# Patient Record
Sex: FEMALE | Race: WHITE | NOT HISPANIC OR LATINO | Employment: FULL TIME | URBAN - METROPOLITAN AREA
[De-identification: names, ages, dates, MRNs, and addresses within clinical notes are randomized per-mention and may not be internally consistent; named-entity substitution may affect disease eponyms.]

---

## 2017-03-25 ENCOUNTER — APPOINTMENT (OUTPATIENT)
Dept: ULTRASOUND IMAGING | Facility: HOSPITAL | Age: 27
End: 2017-03-25

## 2017-03-25 ENCOUNTER — HOSPITAL ENCOUNTER (EMERGENCY)
Facility: HOSPITAL | Age: 27
Discharge: HOME | End: 2017-03-25
Attending: EMERGENCY MEDICINE | Admitting: EMERGENCY MEDICINE

## 2017-03-25 VITALS
HEIGHT: 63 IN | WEIGHT: 252 LBS | TEMPERATURE: 98.3 F | DIASTOLIC BLOOD PRESSURE: 70 MMHG | HEART RATE: 88 BPM | RESPIRATION RATE: 16 BRPM | OXYGEN SATURATION: 98 % | SYSTOLIC BLOOD PRESSURE: 130 MMHG | BODY MASS INDEX: 44.65 KG/M2

## 2017-03-25 DIAGNOSIS — O20.0 ABORTION, THREATENED, EARLY PREGNANCY: ICD-10-CM

## 2017-03-25 DIAGNOSIS — Z3A.01 LESS THAN 8 WEEKS GESTATION OF PREGNANCY: Primary | ICD-10-CM

## 2017-03-25 DIAGNOSIS — N93.9 VAGINAL BLEEDING: ICD-10-CM

## 2017-03-25 LAB
ABO GROUP BLD: NORMAL
ANION GAP SERPL CALCULATED.3IONS-SCNC: 3 MMOL/L (ref 3–11)
BASOPHILS # BLD AUTO: 0.02 10*3/MM3 (ref 0–0.2)
BASOPHILS NFR BLD AUTO: 0.2 % (ref 0–1)
BUN BLD-MCNC: 11 MG/DL (ref 9–23)
BUN/CREAT SERPL: 15.7 (ref 7–25)
CALCIUM SPEC-SCNC: 9.6 MG/DL (ref 8.7–10.4)
CHLORIDE SERPL-SCNC: 107 MMOL/L (ref 99–109)
CO2 SERPL-SCNC: 29 MMOL/L (ref 20–31)
CREAT BLD-MCNC: 0.7 MG/DL (ref 0.6–1.3)
DEPRECATED RDW RBC AUTO: 39.9 FL (ref 37–54)
EOSINOPHIL # BLD AUTO: 0.09 10*3/MM3 (ref 0.1–0.3)
EOSINOPHIL NFR BLD AUTO: 0.9 % (ref 0–3)
ERYTHROCYTE [DISTWIDTH] IN BLOOD BY AUTOMATED COUNT: 12.4 % (ref 11.3–14.5)
GFR SERPL CREATININE-BSD FRML MDRD: 100 ML/MIN/1.73
GLUCOSE BLD-MCNC: 94 MG/DL (ref 70–100)
HCG INTACT+B SERPL-ACNC: 13 MIU/ML
HCT VFR BLD AUTO: 41 % (ref 34.5–44)
HGB BLD-MCNC: 13.6 G/DL (ref 11.5–15.5)
HOLD SPECIMEN: NORMAL
HOLD SPECIMEN: NORMAL
IMM GRANULOCYTES # BLD: 0.01 10*3/MM3 (ref 0–0.03)
IMM GRANULOCYTES NFR BLD: 0.1 % (ref 0–0.6)
LYMPHOCYTES # BLD AUTO: 2.41 10*3/MM3 (ref 0.6–4.8)
LYMPHOCYTES NFR BLD AUTO: 24.8 % (ref 24–44)
MCH RBC QN AUTO: 29.8 PG (ref 27–31)
MCHC RBC AUTO-ENTMCNC: 33.2 G/DL (ref 32–36)
MCV RBC AUTO: 89.7 FL (ref 80–99)
MONOCYTES # BLD AUTO: 0.66 10*3/MM3 (ref 0–1)
MONOCYTES NFR BLD AUTO: 6.8 % (ref 0–12)
NEUTROPHILS # BLD AUTO: 6.53 10*3/MM3 (ref 1.5–8.3)
NEUTROPHILS NFR BLD AUTO: 67.2 % (ref 41–71)
NUMBER OF DOSES: NORMAL
PLATELET # BLD AUTO: 223 10*3/MM3 (ref 150–450)
PMV BLD AUTO: 9.9 FL (ref 6–12)
POTASSIUM BLD-SCNC: 3.7 MMOL/L (ref 3.5–5.5)
RBC # BLD AUTO: 4.57 10*6/MM3 (ref 3.89–5.14)
RH BLD: POSITIVE
SODIUM BLD-SCNC: 139 MMOL/L (ref 132–146)
WBC NRBC COR # BLD: 9.72 10*3/MM3 (ref 3.5–10.8)
WHOLE BLOOD HOLD SPECIMEN: NORMAL
WHOLE BLOOD HOLD SPECIMEN: NORMAL

## 2017-03-25 PROCEDURE — 86850 RBC ANTIBODY SCREEN: CPT | Performed by: EMERGENCY MEDICINE

## 2017-03-25 PROCEDURE — 36415 COLL VENOUS BLD VENIPUNCTURE: CPT

## 2017-03-25 PROCEDURE — 84702 CHORIONIC GONADOTROPIN TEST: CPT | Performed by: EMERGENCY MEDICINE

## 2017-03-25 PROCEDURE — 85025 COMPLETE CBC W/AUTO DIFF WBC: CPT | Performed by: EMERGENCY MEDICINE

## 2017-03-25 PROCEDURE — 86900 BLOOD TYPING SEROLOGIC ABO: CPT | Performed by: EMERGENCY MEDICINE

## 2017-03-25 PROCEDURE — 86901 BLOOD TYPING SEROLOGIC RH(D): CPT | Performed by: EMERGENCY MEDICINE

## 2017-03-25 PROCEDURE — 99283 EMERGENCY DEPT VISIT LOW MDM: CPT

## 2017-03-25 PROCEDURE — 80048 BASIC METABOLIC PNL TOTAL CA: CPT | Performed by: EMERGENCY MEDICINE

## 2017-03-25 RX ORDER — SODIUM CHLORIDE 0.9 % (FLUSH) 0.9 %
10 SYRINGE (ML) INJECTION AS NEEDED
Status: DISCONTINUED | OUTPATIENT
Start: 2017-03-25 | End: 2017-03-25 | Stop reason: HOSPADM

## 2017-03-25 RX ORDER — RANITIDINE HCL 75 MG
75 TABLET ORAL DAILY
COMMUNITY
End: 2018-03-29

## 2017-03-25 NOTE — ED PROVIDER NOTES
Subjective   Patient is a 27 y.o. female presenting with pregnancy problem.   History provided by:  Patient and significant other  Pregnancy Problem   The current episode started today. The problem has been unchanged. The problem occurs every urination. Gestational age is 7 weeks (Patient unsure of dates as she has had irregular periods).   Primary symptoms include abdominal pain (Mild pelvic cramping) and vaginal bleeding. Primary symptoms: Reports light pink scant bleeding with wiping only. There has been no prenatal care (Is scheduled for 6 days from now with [facility]).   The abdominal pain began today. The abdominal pain has been unchanged since its onset. The abdominal pain is located in the suprapubic region. Pain is described as cramping (Mild). The abdominal pain is relieved by nothing.   Associated symptoms include no dysuria, no fever, no light-headedness, no nausea, no syncope and no vomiting.   Risk factors include obesity.       Review of Systems   Constitutional: Negative for fever.   Cardiovascular: Negative for syncope.   Gastrointestinal: Positive for abdominal pain (Mild pelvic cramping). Negative for nausea and vomiting.   Genitourinary: Positive for vaginal bleeding. Negative for dysuria.   Neurological: Negative for dizziness and light-headedness.   All other systems reviewed and are negative.      History reviewed. No pertinent past medical history.    Allergies   Allergen Reactions   • Claritin [Loratadine] Cough   • Promethazine Swelling       History reviewed. No pertinent surgical history.    History reviewed. No pertinent family history.    Social History     Social History   • Marital status:      Spouse name: N/A   • Number of children: N/A   • Years of education: N/A     Social History Main Topics   • Smoking status: Former Smoker     Types: Cigarettes     Quit date: 10/12/2016   • Smokeless tobacco: None   • Alcohol use No   • Drug use: No   • Sexual activity: Yes      Partners: Male     Other Topics Concern   • None     Social History Narrative   • None           Objective   Physical Exam   Constitutional: She is oriented to person, place, and time. She appears well-developed and well-nourished.   HENT:   Right Ear: External ear normal.   Left Ear: External ear normal.   Neck: Neck supple.   Cardiovascular: Normal rate, regular rhythm, normal heart sounds and intact distal pulses.    Pulmonary/Chest: Effort normal and breath sounds normal.   Abdominal: Soft. Bowel sounds are normal.   Neurological: She is alert and oriented to person, place, and time.   Skin: Skin is warm and dry.   Psychiatric: She has a normal mood and affect. Her behavior is normal.   Vitals reviewed.      Procedures         ED Course  ED Course      Recent Results (from the past 24 hour(s))   Basic Metabolic Panel    Collection Time: 17 11:55 AM   Result Value Ref Range    Glucose 94 70 - 100 mg/dL    BUN 11 9 - 23 mg/dL    Creatinine 0.70 0.60 - 1.30 mg/dL    Sodium 139 132 - 146 mmol/L    Potassium 3.7 3.5 - 5.5 mmol/L    Chloride 107 99 - 109 mmol/L    CO2 29.0 20.0 - 31.0 mmol/L    Calcium 9.6 8.7 - 10.4 mg/dL    eGFR Non African Amer 100 >60 mL/min/1.73    BUN/Creatinine Ratio 15.7 7.0 - 25.0    Anion Gap 3.0 3.0 - 11.0 mmol/L   hCG, Quantitative, Pregnancy    Collection Time: 17 11:55 AM   Result Value Ref Range    HCG Quantitative 13.00 mIU/mL    RhIg Evaluation    Collection Time: 17 11:55 AM   Result Value Ref Range    ABO Type A     RH type Positive    Doses of Rh Immune Globulin    Collection Time: 17 11:55 AM   Result Value Ref Range    Number of Doses       RhIg is not indicated due to the patient's Rh status   CBC Auto Differential    Collection Time: 17 11:55 AM   Result Value Ref Range    WBC 9.72 3.50 - 10.80 10*3/mm3    RBC 4.57 3.89 - 5.14 10*6/mm3    Hemoglobin 13.6 11.5 - 15.5 g/dL    Hematocrit 41.0 34.5 - 44.0 %    MCV 89.7 80.0 - 99.0 fL     "MCH 29.8 27.0 - 31.0 pg    MCHC 33.2 32.0 - 36.0 g/dL    RDW 12.4 11.3 - 14.5 %    RDW-SD 39.9 37.0 - 54.0 fl    MPV 9.9 6.0 - 12.0 fL    Platelets 223 150 - 450 10*3/mm3    Neutrophil % 67.2 41.0 - 71.0 %    Lymphocyte % 24.8 24.0 - 44.0 %    Monocyte % 6.8 0.0 - 12.0 %    Eosinophil % 0.9 0.0 - 3.0 %    Basophil % 0.2 0.0 - 1.0 %    Immature Grans % 0.1 0.0 - 0.6 %    Neutrophils, Absolute 6.53 1.50 - 8.30 10*3/mm3    Lymphocytes, Absolute 2.41 0.60 - 4.80 10*3/mm3    Monocytes, Absolute 0.66 0.00 - 1.00 10*3/mm3    Eosinophils, Absolute 0.09 (L) 0.10 - 0.30 10*3/mm3    Basophils, Absolute 0.02 0.00 - 0.20 10*3/mm3    Immature Grans, Absolute 0.01 0.00 - 0.03 10*3/mm3     Note: In addition to lab results from this visit, the labs listed above may include labs taken at another facility or during a different encounter within the last 24 hours. Please correlate lab times with ED admission and discharge times for further clarification of the services performed during this visit.    US Ob Transvaginal    (Results Pending)     Vitals:    17 1018   BP: 138/79   BP Location: Left arm   Patient Position: Sitting   Pulse: 96   Resp: 16   Temp: 98 °F (36.7 °C)   TempSrc: Oral   SpO2: 98%   Weight: 252 lb (114 kg)   Height: 63\" (160 cm)     Medications   sodium chloride 0.9 % flush 10 mL (not administered)     ECG/EMG Results (last 24 hours)     ** No results found for the last 24 hours. **        Discussed lab findings with patient and significant other. Discussed hat an ultrasound would not be beneficial at this point in pregnancy based on her hCG.  We will have her follow-up with her OB/GYN on Monday for an hCG recheck.  And in the meantime monitor bleeding and return to ER with significant or heavy bleeding or development of new symptoms.          MDM    Final diagnoses:   Less than 8 weeks gestation of pregnancy   Vaginal bleeding   , threatened, early pregnancy            Irais Edmondson, APRN  17 " [id number]

## 2017-03-25 NOTE — ED PROVIDER NOTES
Subjective   Patient is a 27 y.o. female presenting with vaginal bleeding.   History provided by:  Patient  Vaginal Bleeding       Review of Systems   Genitourinary: Positive for vaginal bleeding.       History reviewed. No pertinent past medical history.    Allergies   Allergen Reactions   • Claritin [Loratadine] Cough   • Promethazine Swelling       History reviewed. No pertinent surgical history.    History reviewed. No pertinent family history.    Social History     Social History   • Marital status:      Spouse name: N/A   • Number of children: N/A   • Years of education: N/A     Social History Main Topics   • Smoking status: Former Smoker     Types: Cigarettes     Quit date: 10/12/2016   • Smokeless tobacco: None   • Alcohol use No   • Drug use: No   • Sexual activity: Yes     Partners: Male     Other Topics Concern   • None     Social History Narrative   • None         Objective   Physical Exam    Procedures         ED Course  ED Course                     MDM    Final diagnoses:   None       Documentation assistance provided by diana CHAUHAN.  Information recorded by the diana was done at my direction and has been verified and validated by me.     Celeste Chauhan  03/25/17 7545

## 2017-03-27 ENCOUNTER — APPOINTMENT (OUTPATIENT)
Dept: LAB | Facility: HOSPITAL | Age: 27
End: 2017-03-27

## 2017-03-27 ENCOUNTER — TRANSCRIBE ORDERS (OUTPATIENT)
Dept: LAB | Facility: HOSPITAL | Age: 27
End: 2017-03-27

## 2017-03-27 DIAGNOSIS — O02.1 MISSED ABORTION: Primary | ICD-10-CM

## 2017-03-27 LAB — HCG INTACT+B SERPL-ACNC: <5 MIU/ML

## 2017-03-27 PROCEDURE — 36415 COLL VENOUS BLD VENIPUNCTURE: CPT | Performed by: NURSE PRACTITIONER

## 2017-03-27 PROCEDURE — 84702 CHORIONIC GONADOTROPIN TEST: CPT | Performed by: NURSE PRACTITIONER

## 2018-02-22 ENCOUNTER — HOSPITAL ENCOUNTER (EMERGENCY)
Facility: HOSPITAL | Age: 28
Discharge: HOME | End: 2018-02-22
Attending: EMERGENCY MEDICINE | Admitting: EMERGENCY MEDICINE

## 2018-02-22 VITALS
HEART RATE: 109 BPM | DIASTOLIC BLOOD PRESSURE: 80 MMHG | TEMPERATURE: 98.1 F | BODY MASS INDEX: 42.88 KG/M2 | WEIGHT: 233 LBS | OXYGEN SATURATION: 98 % | RESPIRATION RATE: 17 BRPM | HEIGHT: 62 IN | SYSTOLIC BLOOD PRESSURE: 134 MMHG

## 2018-02-22 DIAGNOSIS — R52 BODY ACHES: ICD-10-CM

## 2018-02-22 DIAGNOSIS — F17.200 TOBACCO DEPENDENCE: ICD-10-CM

## 2018-02-22 DIAGNOSIS — R05.9 COUGH: ICD-10-CM

## 2018-02-22 DIAGNOSIS — J10.1 INFLUENZA B: Primary | ICD-10-CM

## 2018-02-22 LAB
FLUAV AG NPH QL: NEGATIVE
FLUBV AG NPH QL IA: POSITIVE

## 2018-02-22 PROCEDURE — 87804 INFLUENZA ASSAY W/OPTIC: CPT | Performed by: EMERGENCY MEDICINE

## 2018-02-22 PROCEDURE — 99283 EMERGENCY DEPT VISIT LOW MDM: CPT

## 2018-02-22 RX ORDER — OSELTAMIVIR PHOSPHATE 75 MG/1
75 CAPSULE ORAL ONCE
Status: COMPLETED | OUTPATIENT
Start: 2018-02-22 | End: 2018-02-22

## 2018-02-22 RX ORDER — BENZONATATE 200 MG/1
200 CAPSULE ORAL 3 TIMES DAILY PRN
Qty: 30 CAPSULE | Refills: 0 | Status: SHIPPED | OUTPATIENT
Start: 2018-02-22 | End: 2018-02-26

## 2018-02-22 RX ORDER — OSELTAMIVIR PHOSPHATE 75 MG/1
75 CAPSULE ORAL EVERY 12 HOURS
Qty: 9 CAPSULE | Refills: 0 | Status: SHIPPED | OUTPATIENT
Start: 2018-02-22 | End: 2018-03-29

## 2018-02-22 RX ORDER — ALBUTEROL SULFATE 90 UG/1
2 AEROSOL, METERED RESPIRATORY (INHALATION) EVERY 4 HOURS PRN
Qty: 1 INHALER | Refills: 0 | Status: SHIPPED | OUTPATIENT
Start: 2018-02-22

## 2018-02-22 RX ORDER — CALCIUM CARBONATE 200(500)MG
1 TABLET,CHEWABLE ORAL AS NEEDED
COMMUNITY
End: 2018-02-26

## 2018-02-22 RX ADMIN — OSELTAMIVIR PHOSPHATE 75 MG: 75 CAPSULE ORAL at 12:51

## 2018-02-22 NOTE — ED PROVIDER NOTES
Subjective   HPI Comments: This is a 28-year-old  female that presents to the ER with cough  and congestion ×4 days.  Patient reports subjective fever with chills ×2 days duration.  Cough is nonproductive and sounds deep according to patient.  She reports chest tightness and soreness secondary to increased frequency of cough.  She denies any headache, but she does report some nasal congestion and postnasal drainage.  Nasal drainage is clear.  She denies any wheezing.  She denies any history of asthma or other respiratory illnesses.  She does smoke a half a pack of cigarettes per day.  She reports mild shortness of breath with exertion and frequent coughing spells.  O2 saturation is normal at 98% on room air.  She denies any pleuritic-type chest pain.  She denies any abdominal pain or nausea with vomiting.  She does report diarrhea for the last couple of days.  She denies any dysuria, frequency, or urgency.  She denies any neck pain or stiffness.  Last menstrual period was one week ago.  She has been taking over-the-counter Alexa-Stayton cold and flu, as well as Benadryl without much relief.  She denies any known sick contacts.      Patient is a 28 y.o. female presenting with cough.   History provided by:  Patient  Cough   Cough characteristics:  Non-productive and barking (deep cough per patient)  Severity:  Moderate  Duration:  4 days  Timing:  Constant  Progression:  Worsening  Chronicity:  New  Smoker: yes (1/2ppd)    Context: smoke exposure and weather changes    Context: not sick contacts    Relieved by:  Nothing  Worsened by:  Activity  Ineffective treatments: Benadryl x 2 days, and Alexa Stayton Cold and Flu without relief.  Associated symptoms: chest pain (chest tightness all across anterior chest), chills, fever (subjective fever x 2 days.), myalgias (generalized body aches) and shortness of breath (with exertion and frequent coughing spells)    Associated symptoms: no ear pain, no headaches, no sore  throat and no wheezing        Review of Systems   Constitutional: Positive for chills and fever (subjective fever x 2 days.).   HENT: Positive for congestion and postnasal drip. Negative for ear pain, sinus pain, sinus pressure and sore throat.    Respiratory: Positive for cough, chest tightness and shortness of breath (with exertion and frequent coughing spells). Negative for wheezing.    Cardiovascular: Positive for chest pain (chest tightness all across anterior chest).   Gastrointestinal: Positive for diarrhea (Last BM yesterday). Negative for abdominal pain, constipation, nausea and vomiting.   Genitourinary: Negative for difficulty urinating, dysuria, flank pain and frequency.        LMP: 1 week   Musculoskeletal: Positive for myalgias (generalized body aches). Negative for back pain.   Neurological: Positive for dizziness (with coughing spells). Negative for syncope, weakness, light-headedness and headaches.   Psychiatric/Behavioral: Negative.        Past Medical History:   Diagnosis Date   • Bronchitis    • Pneumonia        Allergies   Allergen Reactions   • Claritin [Loratadine] Cough   • Promethazine Swelling       History reviewed. No pertinent surgical history.    History reviewed. No pertinent family history.    Social History     Social History   • Marital status:      Spouse name: N/A   • Number of children: N/A   • Years of education: N/A     Social History Main Topics   • Smoking status: Current Some Day Smoker     Packs/day: 0.50     Types: Cigarettes     Last attempt to quit: 10/12/2016   • Smokeless tobacco: Never Used   • Alcohol use No   • Drug use: No   • Sexual activity: Yes     Partners: Male     Other Topics Concern   • None     Social History Narrative   • None           Objective   Physical Exam   Constitutional: She is oriented to person, place, and time. She appears well-developed and well-nourished. No distress.   HENT:   Head: Normocephalic and atraumatic.   Right Ear:  Tympanic membrane normal. No drainage.   Left Ear: Tympanic membrane normal. No drainage.   Nose: Rhinorrhea present. Right sinus exhibits no maxillary sinus tenderness and no frontal sinus tenderness. Left sinus exhibits no maxillary sinus tenderness and no frontal sinus tenderness.   Mouth/Throat: Mucous membranes are normal. No oropharyngeal exudate or posterior oropharyngeal erythema.   Air fluid levels noted to left TM.  Positive nasal congestion.   Eyes: Conjunctivae are normal. No scleral icterus.   Neck: Normal range of motion. Neck supple. No Brudzinski's sign and no Kernig's sign noted.   No cervical LAD.   Cardiovascular: Regular rhythm and normal heart sounds.  Tachycardia present.    Mild tachycardia.   Pulmonary/Chest: Effort normal and breath sounds normal. No respiratory distress. She has no decreased breath sounds. She has no wheezes. She has no rhonchi. She has no rales.   No cough on exam.  Good air exchange to bilateral lung fields.  No tachypnea or retractions.   Abdominal: Soft. She exhibits no distension. There is no tenderness.   Musculoskeletal: Normal range of motion. She exhibits no edema.   Lymphadenopathy:     She has no cervical adenopathy.   Neurological: She is alert and oriented to person, place, and time.   Skin: Skin is warm and dry. She is not diaphoretic.   Psychiatric: She has a normal mood and affect. Her behavior is normal.   Nursing note and vitals reviewed.      Procedures         ED Course  ED Course   Comment By Time   Exam and vitals are stable.  Patient is afebrile and O2 sat is 98% on RA.  Will treat with Tamiflu and recommend OTC symptomatic, supportive measures.  Patient is stable for discharge to home.  She requests a work excuse for earlier this week and for us to complete [facility] paperwork.  I advised her that her PCP will have to fill out that paperwork. Sahara Lepe PA-C 02/22 3737      Recent Results (from the past 24 hour(s))   Influenza Antigen, Rapid - Swab,  "Nasopharynx    Collection Time: 02/22/18 12:00 PM   Result Value Ref Range    Influenza A Ag, EIA Negative Negative    Influenza B Ag, EIA Positive (A) Negative     Note: In addition to lab results from this visit, the labs listed above may include labs taken at another facility or during a different encounter within the last 24 hours. Please correlate lab times with ED admission and discharge times for further clarification of the services performed during this visit.    No orders to display     Vitals:    02/22/18 1153   BP: 134/80   Pulse: 109   Resp: 17   Temp: 98.1 °F (36.7 °C)   TempSrc: Oral   SpO2: 98%   Weight: 106 kg (233 lb)   Height: 157.5 cm (62\")     Medications   oseltamivir (TAMIFLU) capsule 75 mg (75 mg Oral Given 2/22/18 1251)     ECG/EMG Results (last 24 hours)     ** No results found for the last 24 hours. **                      [facility]    Final diagnoses:   Influenza B   Cough   Body aches   Tobacco dependence            Sahara Lepe PA-C  02/22/18 1256    "

## 2018-02-22 NOTE — DISCHARGE INSTRUCTIONS
Patient tested positive for influenza B.  Vital signs are stable, including O2 saturation.  Lung exam reveals that lungs are clear to auscultation.  We will prescribe Tamiflu, Proventil inhaler, and Tessalon Perles as needed for cough.  Strongly recommend tobacco cessation.  Patient may also take over-the-counter cold and flu preparations as needed for symptomatic relief.  Recommend patient to establish care with PCP for recheck.  I will give her a list of accepting physicians.  She can have a work excuse for 2/22/2018 and 2/23/2018.  Return if worsening symptoms of fever or respiratory distress.    Follow up with one of the [facility] physician groups below to setup primary care. If you have trouble following up, please call Prisca Connor, our transitional care nurse, at [phone].    (Dr. Palomino, Dr. Jennings, Dr. Ricci, and Dr. Hernandez.)  [facility], Primary Care, [phone], [address], [place] [address]    [facility], Primary Care, [phone], [address], [address] [place], [address] [facility]) [facility], Primary Care, [phone], [address], [address] [place], [address] [facility]) [facility], Primary Care, [phone], [address], [address] [place], [address]     [place] [facility], Primary Care, [phone], [address], [address] [place], [address]    [place] [address] [facility], Primary Care, [phone], [address], [place]    [place], [address] [facility], Primary Care, [phone], [address], [address] [place], [address] [facility], Primary Care, [phone], [address], [address] [place], [address] [facility]) [facility], Primary  Care, [phone], [address], [address] [place], [address] [place]     ([place]) [facility], Primary Care, [phone], [address], [address] [place], [address] [facility], Primary Care, [phone], [address], [place], [address]     [facility], Primary Care, [phone], [address], [place] [address] [place], [facility]

## 2018-02-26 ENCOUNTER — OFFICE VISIT (OUTPATIENT)
Dept: FAMILY MEDICINE CLINIC | Facility: CLINIC | Age: 28
End: 2018-02-26

## 2018-02-26 VITALS
HEIGHT: 62 IN | OXYGEN SATURATION: 97 % | HEART RATE: 64 BPM | BODY MASS INDEX: 44.5 KG/M2 | TEMPERATURE: 97.9 F | WEIGHT: 241.8 LBS | DIASTOLIC BLOOD PRESSURE: 78 MMHG | RESPIRATION RATE: 20 BRPM | SYSTOLIC BLOOD PRESSURE: 102 MMHG

## 2018-02-26 DIAGNOSIS — J20.8 ACUTE BRONCHITIS DUE TO OTHER SPECIFIED ORGANISMS: ICD-10-CM

## 2018-02-26 DIAGNOSIS — Z76.89 ENCOUNTER TO ESTABLISH CARE: Primary | ICD-10-CM

## 2018-02-26 PROCEDURE — 99203 OFFICE O/P NEW LOW 30 MIN: CPT | Performed by: PHYSICIAN ASSISTANT

## 2018-02-26 RX ORDER — OMEPRAZOLE 20 MG/1
20 CAPSULE, DELAYED RELEASE ORAL DAILY
COMMUNITY
End: 2019-03-28

## 2018-02-26 RX ORDER — PREDNISONE 20 MG/1
20 TABLET ORAL 2 TIMES DAILY
Qty: 10 TABLET | Refills: 0 | Status: SHIPPED | OUTPATIENT
Start: 2018-02-26 | End: 2018-03-29

## 2018-02-26 RX ORDER — AZITHROMYCIN 250 MG/1
TABLET, FILM COATED ORAL
Qty: 6 TABLET | Refills: 0 | Status: SHIPPED | OUTPATIENT
Start: 2018-02-26 | End: 2018-03-29

## 2018-02-26 NOTE — PROGRESS NOTES
Subjective   Malissa Johnson is a 28 y.o. female.     History of Present Illness   Pt presents to reestablish care. Diagnosed last week with Influenza B and [facility] ER. Still taking tamiflu. Since ER visit, cough seems to be getting more productive. Some SOB and wheezing. Recently stopped smoking   Needs FMLA completed for postal [employer]     Megan DONNELLY- sister has hx of ovarian cancer. Pt has fertility issues. No children, 1 miscarriage.     The following portions of the patient's history were reviewed and updated as appropriate: allergies, current medications, past family history, past medical history, past social history, past surgical history and problem list.    Review of Systems   Constitutional: Positive for fatigue. Negative for chills, diaphoresis and fever.   HENT: Positive for congestion. Negative for ear discharge, ear pain, hearing loss, nosebleeds, postnasal drip, sinus pressure, sneezing and sore throat.    Eyes: Negative.    Respiratory: Positive for cough, shortness of breath and wheezing. Negative for chest tightness.    Cardiovascular: Negative.  Negative for chest pain, palpitations and leg swelling.   Gastrointestinal: Negative for abdominal distention, abdominal pain, anal bleeding, blood in stool, constipation, diarrhea, nausea, rectal pain and vomiting.   Endocrine: Negative.  Negative for cold intolerance, heat intolerance, polydipsia, polyphagia and polyuria.   Genitourinary: Negative.  Negative for difficulty urinating, dysuria, flank pain, frequency, hematuria and urgency.   Musculoskeletal: Negative.  Negative for arthralgias, back pain, gait problem, joint swelling, myalgias, neck pain and neck stiffness.   Skin: Negative.  Negative for color change, pallor, rash and wound.   Allergic/Immunologic: Negative.  Negative for immunocompromised state.   Neurological: Negative for dizziness, syncope, weakness, light-headedness, numbness and headaches.   Hematological: Negative.   "Negative for adenopathy. Does not bruise/bleed easily.   Psychiatric/Behavioral: Negative.  Negative for behavioral problems, confusion, self-injury, sleep disturbance and suicidal ideas. The patient is not nervous/anxious.        Objective    Blood pressure 102/78, pulse 64, temperature 97.9 °F (36.6 °C), resp. rate 20, height 157.5 cm (62\"), weight 110 kg (241 lb 12.8 oz), last menstrual period 06/01/2017, SpO2 97 %.     Physical Exam   Constitutional: She is oriented to person, place, and time. She appears well-developed and well-nourished.   HENT:   Head: Normocephalic and atraumatic.   Right Ear: Tympanic membrane, external ear and ear canal normal.   Left Ear: Tympanic membrane, external ear and ear canal normal.   Nose: Nose normal.   Mouth/Throat: Oropharynx is clear and moist. No oropharyngeal exudate.   Eyes: Conjunctivae and EOM are normal. Pupils are equal, round, and reactive to light.   Neck: Normal range of motion. Neck supple. No tracheal deviation present. No thyromegaly present.   Cardiovascular: Normal rate, regular rhythm, normal heart sounds and intact distal pulses.  Exam reveals no gallop and no friction rub.    No murmur heard.  Pulmonary/Chest: Effort normal. No respiratory distress. She has wheezes. She has rhonchi. She has no rales. She exhibits no tenderness.   Abdominal: Soft. Bowel sounds are normal. She exhibits no distension and no mass. There is no tenderness. There is no rebound and no guarding. No hernia.   Musculoskeletal: Normal range of motion. She exhibits no edema, tenderness or deformity.   Lymphadenopathy:     She has no cervical adenopathy.   Neurological: She is alert and oriented to person, place, and time.   Skin: Skin is warm and dry.   Psychiatric: She has a normal mood and affect. Her behavior is normal. Judgment and thought content normal.   Nursing note and vitals reviewed.      Assessment/Plan   Malissa was seen today for establish care and er follow up.    Diagnoses " and all orders for this visit:    Encounter to establish care    Acute bronchitis due to other specified organisms  -     predniSONE (DELTASONE) 20 MG tablet; Take 1 tablet by mouth 2 (Two) Times a Day.  -     azithromycin (ZITHROMAX Z-SHANON) 250 MG tablet; Take 2 tablets the first day, then 1 tablet daily for 4 days.  ONELIA competed for her work due to time missed for flu  Lung symptoms progressed to bronchitis. Treatment as outlined in plan. F/U INB     Recently started vegetarian diet. Would like labs to check iron and vitamin levels in the future.

## 2019-03-22 ENCOUNTER — TRANSCRIBE ORDERS (OUTPATIENT)
Dept: LAB | Facility: HOSPITAL | Age: 29
End: 2019-03-22

## 2019-03-22 ENCOUNTER — LAB (OUTPATIENT)
Dept: LAB | Facility: HOSPITAL | Age: 29
End: 2019-03-22

## 2019-03-22 DIAGNOSIS — O02.1 MISSED ABORTION: Primary | ICD-10-CM

## 2019-03-22 DIAGNOSIS — O02.1 MISSED ABORTION: ICD-10-CM

## 2019-03-22 LAB
ABO GROUP BLD: NORMAL
BASOPHILS # BLD AUTO: 0.03 10*3/MM3 (ref 0–0.2)
BASOPHILS NFR BLD AUTO: 0.3 % (ref 0–1)
BLD GP AB SCN SERPL QL: NEGATIVE
DEPRECATED RDW RBC AUTO: 39.5 FL (ref 37–54)
EOSINOPHIL # BLD AUTO: 0.09 10*3/MM3 (ref 0–0.3)
EOSINOPHIL NFR BLD AUTO: 0.9 % (ref 0–3)
ERYTHROCYTE [DISTWIDTH] IN BLOOD BY AUTOMATED COUNT: 12.4 % (ref 11.3–14.5)
GLUCOSE BLD-MCNC: 81 MG/DL (ref 70–100)
HBV SURFACE AG SERPL QL IA: NORMAL
HCT VFR BLD AUTO: 41.8 % (ref 34.5–44)
HCV AB SER DONR QL: NORMAL
HGB BLD-MCNC: 14.3 G/DL (ref 11.5–15.5)
HIV1+2 AB SER QL: NORMAL
IMM GRANULOCYTES # BLD AUTO: 0.02 10*3/MM3 (ref 0–0.05)
IMM GRANULOCYTES NFR BLD AUTO: 0.2 % (ref 0–0.6)
LYMPHOCYTES # BLD AUTO: 2.71 10*3/MM3 (ref 0.6–4.8)
LYMPHOCYTES NFR BLD AUTO: 28.3 % (ref 24–44)
MCH RBC QN AUTO: 29.8 PG (ref 27–31)
MCHC RBC AUTO-ENTMCNC: 34.2 G/DL (ref 32–36)
MCV RBC AUTO: 87.1 FL (ref 80–99)
MONOCYTES # BLD AUTO: 0.5 10*3/MM3 (ref 0–1)
MONOCYTES NFR BLD AUTO: 5.2 % (ref 0–12)
NEUTROPHILS # BLD AUTO: 6.21 10*3/MM3 (ref 1.5–8.3)
NEUTROPHILS NFR BLD AUTO: 65.1 % (ref 41–71)
PLATELET # BLD AUTO: 280 10*3/MM3 (ref 150–450)
PMV BLD AUTO: 10.1 FL (ref 6–12)
RBC # BLD AUTO: 4.8 10*6/MM3 (ref 3.89–5.14)
RH BLD: POSITIVE
RUBV IGG SER QL: NORMAL
RUBV IGG SER-ACNC: 47.3 IU/ML
T4 FREE SERPL-MCNC: 1.18 NG/DL (ref 0.89–1.76)
TSH SERPL DL<=0.05 MIU/L-ACNC: 7.77 MIU/ML (ref 0.35–5.35)
WBC NRBC COR # BLD: 9.56 10*3/MM3 (ref 3.5–10.8)

## 2019-03-22 PROCEDURE — 86803 HEPATITIS C AB TEST: CPT

## 2019-03-22 PROCEDURE — 86901 BLOOD TYPING SEROLOGIC RH(D): CPT

## 2019-03-22 PROCEDURE — 36415 COLL VENOUS BLD VENIPUNCTURE: CPT

## 2019-03-22 PROCEDURE — 85025 COMPLETE CBC W/AUTO DIFF WBC: CPT

## 2019-03-22 PROCEDURE — 86762 RUBELLA ANTIBODY: CPT

## 2019-03-22 PROCEDURE — 87340 HEPATITIS B SURFACE AG IA: CPT

## 2019-03-22 PROCEDURE — 80081 OBSTETRIC PANEL INC HIV TSTG: CPT

## 2019-03-22 PROCEDURE — 82947 ASSAY GLUCOSE BLOOD QUANT: CPT

## 2019-03-22 PROCEDURE — 86900 BLOOD TYPING SEROLOGIC ABO: CPT

## 2019-03-22 PROCEDURE — 86850 RBC ANTIBODY SCREEN: CPT

## 2019-03-22 PROCEDURE — 84439 ASSAY OF FREE THYROXINE: CPT

## 2019-03-22 PROCEDURE — 84443 ASSAY THYROID STIM HORMONE: CPT

## 2019-03-22 PROCEDURE — G0432 EIA HIV-1/HIV-2 SCREEN: HCPCS

## 2019-03-25 LAB — RPR SER QL: NORMAL

## 2019-03-26 ENCOUNTER — APPOINTMENT (OUTPATIENT)
Dept: LAB | Facility: HOSPITAL | Age: 29
End: 2019-03-26

## 2019-03-26 DIAGNOSIS — O02.1 MISSED ABORTION: Primary | ICD-10-CM

## 2019-03-26 LAB
HCG INTACT+B SERPL-ACNC: 4422 MIU/ML
PROGEST SERPL-MCNC: 8.64 NG/ML

## 2019-03-26 PROCEDURE — 84702 CHORIONIC GONADOTROPIN TEST: CPT

## 2019-03-26 PROCEDURE — 84144 ASSAY OF PROGESTERONE: CPT

## 2019-03-26 PROCEDURE — 36415 COLL VENOUS BLD VENIPUNCTURE: CPT

## 2019-03-28 ENCOUNTER — OFFICE VISIT (OUTPATIENT)
Dept: ENDOCRINOLOGY | Facility: CLINIC | Age: 29
End: 2019-03-28

## 2019-03-28 ENCOUNTER — TRANSCRIBE ORDERS (OUTPATIENT)
Dept: LAB | Facility: HOSPITAL | Age: 29
End: 2019-03-28

## 2019-03-28 ENCOUNTER — LAB (OUTPATIENT)
Dept: LAB | Facility: HOSPITAL | Age: 29
End: 2019-03-28

## 2019-03-28 VITALS
HEART RATE: 98 BPM | DIASTOLIC BLOOD PRESSURE: 60 MMHG | SYSTOLIC BLOOD PRESSURE: 112 MMHG | BODY MASS INDEX: 45.16 KG/M2 | WEIGHT: 245.4 LBS | HEIGHT: 62 IN | OXYGEN SATURATION: 99 %

## 2019-03-28 DIAGNOSIS — Z3A.01 LESS THAN 8 WEEKS GESTATION OF PREGNANCY: ICD-10-CM

## 2019-03-28 DIAGNOSIS — Z3A.08 8 WEEKS GESTATION OF PREGNANCY: Primary | ICD-10-CM

## 2019-03-28 DIAGNOSIS — Z3A.08 8 WEEKS GESTATION OF PREGNANCY: ICD-10-CM

## 2019-03-28 DIAGNOSIS — E03.9 ACQUIRED HYPOTHYROIDISM: Primary | ICD-10-CM

## 2019-03-28 PROBLEM — J68.0 ACUTE CHEMICAL BRONCHITIS (HCC): Status: ACTIVE | Noted: 2019-03-28

## 2019-03-28 PROBLEM — R11.2 N&V (NAUSEA AND VOMITING): Status: ACTIVE | Noted: 2019-03-28

## 2019-03-28 PROBLEM — K21.9 ACID REFLUX: Status: ACTIVE | Noted: 2019-03-28

## 2019-03-28 LAB
HCG INTACT+B SERPL-ACNC: 7232 MIU/ML
PROGEST SERPL-MCNC: 10.63 NG/ML

## 2019-03-28 PROCEDURE — 84702 CHORIONIC GONADOTROPIN TEST: CPT

## 2019-03-28 PROCEDURE — 36415 COLL VENOUS BLD VENIPUNCTURE: CPT

## 2019-03-28 PROCEDURE — 84144 ASSAY OF PROGESTERONE: CPT

## 2019-03-28 PROCEDURE — 99242 OFF/OP CONSLTJ NEW/EST SF 20: CPT | Performed by: INTERNAL MEDICINE

## 2019-03-28 NOTE — PROGRESS NOTES
Malissa Elizabeth 29 y.o.  CC:Establish Care (New patient being seen at the request of Megan Meza, for a consultation regarding elevated TSH with pregnancy, MARIAH, 2019)      [place]: Establish Care (New patient being seen at the request of Megan Meza, for a consultation regarding elevated TSH with pregnancy, MARIAH, 2019)    Reviewed blood tests  Results for orders placed or performed in visit on 19   hCG, Quantitative, Pregnancy   Result Value Ref Range    HCG Quantitative 4,422.00 mIU/mL   Progesterone   Result Value Ref Range    Progesterone 8.64 ng/mL   free T4 1.18 and TSH 7.76 via outside lab work - reviewed   Management of subclinical hypothyroidism during pregnancy reviewed  Has had c/o fatigue - see ros below   She did have repeat HCG today as she is concerned about pregnancy   She has had a miscarriage - is   No family h/o thyroid disorder and she has no prior problems with thyroid   She has had some nausea / morning sickness   Cause of low thyroid function discussed along with risk of exacerbation postpartum   20 min of face to face counseling this visit related to goals for tsh, risk of high tsh (increased risk miscarriage) and risk of cognitive impairment in fetus kika with low thyroid hormone levels during the second trimester reviewed along with side effects of supplementation and safety during pregnancy       Allergies   Allergen Reactions   • Claritin [Loratadine] Cough   • Promethazine Swelling       Current Outpatient Medications:   •  progesterone (PROMETRIUM) 200 MG capsule, Take 200 mg by mouth Daily., Disp: , Rfl:   •  albuterol (PROVENTIL HFA;VENTOLIN HFA) 108 (90 Base) MCG/ACT inhaler, Inhale 2 puffs Every 4 (Four) Hours As Needed for Wheezing., Disp: 1 inhaler, Rfl: 0  •  Prenatal Vit-Fe Fumarate-FA (PRENATAL VITAMIN PO), Take 1 tablet by mouth Daily., Disp: , Rfl:   There are no active problems to display for this patient.    Review of Systems   Constitutional:  Positive for fatigue. Negative for activity change, appetite change, chills, diaphoresis, fever and unexpected weight change.   HENT: Negative for congestion, dental problem, drooling, ear discharge, ear pain, facial swelling, hearing loss, mouth sores, nosebleeds, postnasal drip, rhinorrhea, sinus pressure, sneezing, sore throat, tinnitus, trouble swallowing and voice change.    Eyes: Negative for photophobia, pain, discharge, redness, itching and visual disturbance.   Respiratory: Positive for shortness of breath. Negative for apnea, cough, choking, chest tightness, wheezing and stridor.    Cardiovascular: Positive for palpitations. Negative for chest pain and leg swelling.   Gastrointestinal: Positive for nausea and vomiting. Negative for abdominal distention, abdominal pain, anal bleeding, blood in stool, constipation, diarrhea and rectal pain.        Gerd   Endocrine: Negative for cold intolerance, heat intolerance, polydipsia, polyphagia and polyuria.   Genitourinary: Negative for decreased urine volume, difficulty urinating, dysuria, enuresis, flank pain, frequency, genital sores, hematuria and urgency.   Musculoskeletal: Negative for arthralgias, back pain, gait problem, joint swelling, myalgias, neck pain and neck stiffness.   Skin: Negative for color change, pallor, rash and wound.   Allergic/Immunologic: Negative for environmental allergies, food allergies and immunocompromised state.   Neurological: Negative for dizziness, tremors, seizures, syncope, facial asymmetry, speech difficulty, weakness, light-headedness, numbness and headaches.   Hematological: Negative for adenopathy. Does not bruise/bleed easily.   Psychiatric/Behavioral: Negative for agitation, behavioral problems, confusion, decreased concentration, dysphoric mood, hallucinations, self-injury, sleep disturbance and suicidal ideas. The patient is not nervous/anxious and is not hyperactive.      Social History     Socioeconomic History   •  "Marital status:      Spouse name: Not on file   • Number of children: Not on file   • Years of education: Not on file   • Highest education level: Not on file   Tobacco Use   • Smoking status: Former Smoker     Packs/day: 0.50     Types: Cigarettes     Last attempt to quit: 10/12/2016     Years since quittin.4   • Smokeless tobacco: Never Used   Substance and Sexual Activity   • Alcohol use: No     Comment: occasionally when not pregnant   • Drug use: No   • Sexual activity: Yes     Partners: Male     Family History   Problem Relation Age of Onset   • Arthritis Mother    • Hyperlipidemia Mother    • Cancer Sister    • Diabetes Maternal Grandmother    • Obesity Maternal Grandmother    • Diabetes Maternal Grandfather    • Heart attack Maternal Grandfather    • Kidney disease Maternal Grandfather    • Obesity Maternal Grandfather    • Diabetes Paternal Grandmother    • Hypertension Paternal Grandmother    • Obesity Paternal Grandmother    • Diabetes Paternal Grandfather    • Obesity Paternal Grandfather    • Migraines Father      /60   Pulse 98   Ht 157.5 cm (62\")   Wt 111 kg (245 lb 6.4 oz)   SpO2 99%   Breastfeeding? No   BMI 44.88 kg/m²   Physical Exam   Constitutional: She is oriented to person, place, and time. She appears well-developed and well-nourished.   HENT:   Head: Normocephalic and atraumatic.   Nose: Nose normal.   Mouth/Throat: Oropharynx is clear and moist.   Eyes: Conjunctivae, EOM and lids are normal. Pupils are equal, round, and reactive to light.   Neck: Trachea normal and normal range of motion. Neck supple. Carotid bruit is not present. No tracheal deviation present. No thyroid mass and no thyromegaly (no goiter or dominant nodule ) present.   Cardiovascular: Normal rate, regular rhythm, normal heart sounds and intact distal pulses. Exam reveals no gallop and no friction rub.   No murmur heard.  Pulmonary/Chest: Effort normal and breath sounds normal. No respiratory " distress. She has no wheezes.   Musculoskeletal: Normal range of motion. She exhibits no edema or deformity.   Lymphadenopathy:     She has no cervical adenopathy.   Neurological: She is alert and oriented to person, place, and time. She has normal reflexes. She displays normal reflexes. No cranial nerve deficit.   Skin: Skin is warm and dry. No rash noted. No cyanosis or erythema. Nails show no clubbing.   Psychiatric: She has a normal mood and affect. Her speech is normal and behavior is normal. Judgment and thought content normal. Cognition and memory are normal.   Nursing note and vitals reviewed.    Results for orders placed or performed in visit on 03/26/19   hCG, Quantitative, Pregnancy   Result Value Ref Range    HCG Quantitative 4,422.00 mIU/mL   Progesterone   Result Value Ref Range    Progesterone 8.64 ng/mL     Problem List Items Addressed This Visit        Endocrine    Acquired hypothyroidism - Primary     Start 100 mcg synthroid- samples sent / provided  F/u 6-8 weeks with follow up tfts and thyroid antibodies  Reviewed outside lab work - normal T4 with high TSH consistent with subclinical hypothyroidism   Management discussed along with goals for treatment in early pregnancy              Return in about 6 weeks (around 5/9/2019) for Recheck 30 min .    Enedina Jones MA  Signed Stefanie Armstrong MD

## 2019-04-14 ENCOUNTER — APPOINTMENT (OUTPATIENT)
Dept: ULTRASOUND IMAGING | Facility: HOSPITAL | Age: 29
End: 2019-04-14

## 2019-04-14 ENCOUNTER — HOSPITAL ENCOUNTER (EMERGENCY)
Facility: HOSPITAL | Age: 29
Discharge: HOME | End: 2019-04-14
Attending: EMERGENCY MEDICINE | Admitting: EMERGENCY MEDICINE

## 2019-04-14 VITALS
DIASTOLIC BLOOD PRESSURE: 66 MMHG | HEART RATE: 88 BPM | OXYGEN SATURATION: 98 % | TEMPERATURE: 98 F | HEIGHT: 62 IN | WEIGHT: 245 LBS | SYSTOLIC BLOOD PRESSURE: 136 MMHG | BODY MASS INDEX: 45.08 KG/M2 | RESPIRATION RATE: 18 BRPM

## 2019-04-14 DIAGNOSIS — O20.9 VAGINAL BLEEDING BEFORE 22 WEEKS GESTATION: Primary | ICD-10-CM

## 2019-04-14 LAB
ABO GROUP BLD: NORMAL
BASOPHILS # BLD AUTO: 0.03 10*3/MM3 (ref 0–0.2)
BASOPHILS NFR BLD AUTO: 0.3 % (ref 0–1.5)
DEPRECATED RDW RBC AUTO: 39.6 FL (ref 37–54)
EOSINOPHIL # BLD AUTO: 0.13 10*3/MM3 (ref 0–0.4)
EOSINOPHIL NFR BLD AUTO: 1.3 % (ref 0.3–6.2)
ERYTHROCYTE [DISTWIDTH] IN BLOOD BY AUTOMATED COUNT: 12.4 % (ref 12.3–15.4)
HCG INTACT+B SERPL-ACNC: NORMAL MIU/ML
HCT VFR BLD AUTO: 40.5 % (ref 34–46.6)
HGB BLD-MCNC: 14 G/DL (ref 12–15.9)
HOLD SPECIMEN: NORMAL
HOLD SPECIMEN: NORMAL
IMM GRANULOCYTES # BLD AUTO: 0.03 10*3/MM3 (ref 0–0.05)
IMM GRANULOCYTES NFR BLD AUTO: 0.3 % (ref 0–0.5)
LYMPHOCYTES # BLD AUTO: 2.4 10*3/MM3 (ref 0.7–3.1)
LYMPHOCYTES NFR BLD AUTO: 23.3 % (ref 19.6–45.3)
MCH RBC QN AUTO: 30.2 PG (ref 26.6–33)
MCHC RBC AUTO-ENTMCNC: 34.6 G/DL (ref 31.5–35.7)
MCV RBC AUTO: 87.3 FL (ref 79–97)
MONOCYTES # BLD AUTO: 0.64 10*3/MM3 (ref 0.1–0.9)
MONOCYTES NFR BLD AUTO: 6.2 % (ref 5–12)
NEUTROPHILS # BLD AUTO: 7.07 10*3/MM3 (ref 1.4–7)
NEUTROPHILS NFR BLD AUTO: 68.6 % (ref 42.7–76)
NUMBER OF DOSES: NORMAL
PLATELET # BLD AUTO: 235 10*3/MM3 (ref 140–450)
PMV BLD AUTO: 9.9 FL (ref 6–12)
RBC # BLD AUTO: 4.64 10*6/MM3 (ref 3.77–5.28)
RH BLD: POSITIVE
WBC NRBC COR # BLD: 10.3 10*3/MM3 (ref 3.4–10.8)
WHOLE BLOOD HOLD SPECIMEN: NORMAL
WHOLE BLOOD HOLD SPECIMEN: NORMAL

## 2019-04-14 PROCEDURE — 86901 BLOOD TYPING SEROLOGIC RH(D): CPT | Performed by: EMERGENCY MEDICINE

## 2019-04-14 PROCEDURE — 99284 EMERGENCY DEPT VISIT MOD MDM: CPT

## 2019-04-14 PROCEDURE — 85025 COMPLETE CBC W/AUTO DIFF WBC: CPT | Performed by: EMERGENCY MEDICINE

## 2019-04-14 PROCEDURE — 76817 TRANSVAGINAL US OBSTETRIC: CPT

## 2019-04-14 PROCEDURE — 86900 BLOOD TYPING SEROLOGIC ABO: CPT | Performed by: EMERGENCY MEDICINE

## 2019-04-14 PROCEDURE — 84702 CHORIONIC GONADOTROPIN TEST: CPT | Performed by: EMERGENCY MEDICINE

## 2019-04-14 RX ORDER — SODIUM CHLORIDE 0.9 % (FLUSH) 0.9 %
10 SYRINGE (ML) INJECTION AS NEEDED
Status: DISCONTINUED | OUTPATIENT
Start: 2019-04-14 | End: 2019-04-15 | Stop reason: HOSPADM

## 2019-04-14 RX ORDER — LEVOTHYROXINE SODIUM 0.1 MG/1
100 TABLET ORAL DAILY
COMMUNITY
End: 2019-04-18 | Stop reason: SDUPTHER

## 2019-04-17 ENCOUNTER — TELEPHONE (OUTPATIENT)
Dept: INTERNAL MEDICINE | Facility: CLINIC | Age: 29
End: 2019-04-17

## 2019-04-17 NOTE — TELEPHONE ENCOUNTER
PHARM CALLED IN REGARDS TO A SCRIPT THAT WAS DROPPED OFF BY THE PATIENT FOR HER SYNTHROID MEDICATION BUT THE PHARM STATES THAT WHAT THE PATIENT DROPPED OFF LOOKED MORE LIKE A NOTE INSTEAD OF THE SCRIPT AND WOULD LIKE TO GET A CALL BACK IN REGARDS TO THIS MATTER -[phone].

## 2019-04-18 ENCOUNTER — TELEPHONE (OUTPATIENT)
Dept: INTERNAL MEDICINE | Facility: CLINIC | Age: 29
End: 2019-04-18

## 2019-04-18 RX ORDER — LEVOTHYROXINE SODIUM 0.1 MG/1
100 TABLET ORAL DAILY
Qty: 30 TABLET | Refills: 3 | Status: SHIPPED | OUTPATIENT
Start: 2019-04-18 | End: 2019-05-08 | Stop reason: SDUPTHER

## 2019-04-18 NOTE — TELEPHONE ENCOUNTER
PT. CALLED AND STATED THAT SHE WAS SEEN BY  A COUPLE OF WEEKS AGO AND SHE WAS SUPPOSED TO SEND A PRESCRIPTION TO [facility] FOR SYNTHROID; PHARMACY HAS NOT RECEIVED PRESCRIPTION AND PT. WOULD LIKE IT SENT OVER

## 2019-04-18 NOTE — TELEPHONE ENCOUNTER
Pt states she was given samples of synthroid 100mcg to last for 3 weeks and now she needs rx sent to pharmacy  Pt advised rx will be sent

## 2019-05-07 ENCOUNTER — OFFICE VISIT (OUTPATIENT)
Dept: ENDOCRINOLOGY | Facility: CLINIC | Age: 29
End: 2019-05-07

## 2019-05-07 VITALS
DIASTOLIC BLOOD PRESSURE: 72 MMHG | BODY MASS INDEX: 46.56 KG/M2 | WEIGHT: 253 LBS | HEART RATE: 86 BPM | HEIGHT: 62 IN | SYSTOLIC BLOOD PRESSURE: 112 MMHG | OXYGEN SATURATION: 99 %

## 2019-05-07 DIAGNOSIS — E03.9 ACQUIRED HYPOTHYROIDISM: Primary | ICD-10-CM

## 2019-05-07 LAB
T4 FREE SERPL-MCNC: 1.37 NG/DL (ref 0.93–1.7)
TSH SERPL DL<=0.05 MIU/L-ACNC: 3.49 MIU/ML (ref 0.27–4.2)

## 2019-05-07 PROCEDURE — 84443 ASSAY THYROID STIM HORMONE: CPT | Performed by: INTERNAL MEDICINE

## 2019-05-07 PROCEDURE — 99213 OFFICE O/P EST LOW 20 MIN: CPT | Performed by: INTERNAL MEDICINE

## 2019-05-07 PROCEDURE — 86376 MICROSOMAL ANTIBODY EACH: CPT | Performed by: INTERNAL MEDICINE

## 2019-05-07 PROCEDURE — 84439 ASSAY OF FREE THYROXINE: CPT | Performed by: INTERNAL MEDICINE

## 2019-05-07 PROCEDURE — 86800 THYROGLOBULIN ANTIBODY: CPT | Performed by: INTERNAL MEDICINE

## 2019-05-07 NOTE — PROGRESS NOTES
Malissa Light 29 y.o.  CC:Follow-up and Hypothyroidism (MARIAH 11-23-19 OB: Megan Meza)      [place]: Follow-up and Hypothyroidism (MARIAH 11-23-19 OB: Megan Meza)    Is on synthroid 100 mcg  No missed doses or problems with dosing  Is 11 1/2 weeks pregnant  A lot of morning sickness    Allergies   Allergen Reactions   • Claritin [Loratadine] Cough   • Promethazine Swelling       Current Outpatient Medications:   •  albuterol (PROVENTIL HFA;VENTOLIN HFA) 108 (90 Base) MCG/ACT inhaler, Inhale 2 puffs Every 4 (Four) Hours As Needed for Wheezing., Disp: 1 inhaler, Rfl: 0  •  levothyroxine (SYNTHROID, LEVOTHROID) 100 MCG tablet, Take 1 tablet by mouth Daily., Disp: 30 tablet, Rfl: 3  •  Prenatal Vit-Fe Fumarate-FA (PRENATAL VITAMIN PO), Take 1 tablet by mouth Daily., Disp: , Rfl:   •  progesterone (PROMETRIUM) 200 MG capsule, Take 200 mg by mouth Daily., Disp: , Rfl:   Patient Active Problem List    Diagnosis   • Acid reflux [K21.9]   • Acute chemical bronchitis (CMS/HCC) [J68.0]   • N&V (nausea and vomiting) [R11.2]   • Acquired hypothyroidism [E03.9]     Review of Systems   Constitutional: Negative for activity change, appetite change, chills, diaphoresis, fatigue, fever and unexpected weight change.   HENT: Negative for congestion, dental problem, drooling, ear discharge, ear pain, facial swelling, hearing loss, mouth sores, nosebleeds, postnasal drip, rhinorrhea, sinus pressure, sneezing, sore throat, tinnitus, trouble swallowing and voice change.    Eyes: Negative for photophobia, pain, discharge, redness, itching and visual disturbance.   Respiratory: Negative for apnea, cough, choking, chest tightness, shortness of breath, wheezing and stridor.    Cardiovascular: Negative for chest pain, palpitations and leg swelling.   Gastrointestinal: Negative for abdominal distention, abdominal pain, anal bleeding, blood in stool, constipation, diarrhea, nausea, rectal pain and vomiting.   Endocrine: Negative for cold  intolerance, heat intolerance, polydipsia, polyphagia and polyuria.   Genitourinary: Negative for decreased urine volume, difficulty urinating, dysuria, enuresis, flank pain, frequency, genital sores, hematuria and urgency.   Musculoskeletal: Negative for arthralgias, back pain, gait problem, joint swelling, myalgias, neck pain and neck stiffness.   Skin: Negative for color change, pallor, rash and wound.   Allergic/Immunologic: Negative for environmental allergies, food allergies and immunocompromised state.   Neurological: Negative for dizziness, tremors, seizures, syncope, facial asymmetry, speech difficulty, weakness, light-headedness, numbness and headaches.   Hematological: Negative for adenopathy. Does not bruise/bleed easily.   Psychiatric/Behavioral: Negative for agitation, behavioral problems, confusion, decreased concentration, dysphoric mood, hallucinations, self-injury, sleep disturbance and suicidal ideas. The patient is not nervous/anxious and is not hyperactive.      Social History     Socioeconomic History   • Marital status:      Spouse name: Not on file   • Number of children: Not on file   • Years of education: Not on file   • Highest education level: Not on file   Tobacco Use   • Smoking status: Former Smoker     Packs/day: 0.50     Types: Cigarettes     Last attempt to quit: 10/12/2016     Years since quittin.5   • Smokeless tobacco: Never Used   Substance and Sexual Activity   • Alcohol use: No     Comment: occasionally when not pregnant   • Drug use: No   • Sexual activity: Yes     Partners: Male     Family History   Problem Relation Age of Onset   • Arthritis Mother    • Hyperlipidemia Mother    • Cancer Sister    • Diabetes Maternal Grandmother    • Obesity Maternal Grandmother    • Diabetes Maternal Grandfather    • Heart attack Maternal Grandfather    • Kidney disease Maternal Grandfather    • Obesity Maternal Grandfather    • Diabetes Paternal Grandmother    • Hypertension  "Paternal Grandmother    • Obesity Paternal Grandmother    • Diabetes Paternal Grandfather    • Obesity Paternal Grandfather    • Migraines Father      /72   Pulse 86   Ht 157.5 cm (62\")   Wt 115 kg (253 lb)   LMP 02/14/2019 (Approximate)   SpO2 99%   Breastfeeding? No   BMI 46.27 kg/m²   Physical Exam   Constitutional: She is oriented to person, place, and time. She appears well-developed and well-nourished.   HENT:   Head: Normocephalic and atraumatic.   Nose: Nose normal.   Mouth/Throat: Oropharynx is clear and moist.   Eyes: Conjunctivae, EOM and lids are normal. Pupils are equal, round, and reactive to light.   Neck: Trachea normal and normal range of motion. Neck supple. Carotid bruit is not present. No tracheal deviation present. No thyroid mass and no thyromegaly present.   Cardiovascular: Normal rate, regular rhythm, normal heart sounds and intact distal pulses. Exam reveals no gallop and no friction rub.   No murmur heard.  Pulmonary/Chest: Effort normal and breath sounds normal. No respiratory distress. She has no wheezes.   Musculoskeletal: Normal range of motion. She exhibits no edema or deformity.   Lymphadenopathy:     She has no cervical adenopathy.   Neurological: She is alert and oriented to person, place, and time. She has normal reflexes. She displays normal reflexes. No cranial nerve deficit.   Skin: Skin is warm and dry. No rash noted. No cyanosis or erythema. Nails show no clubbing.   Psychiatric: She has a normal mood and affect. Her speech is normal and behavior is normal. Judgment and thought content normal. Cognition and memory are normal.   Nursing note and vitals reviewed.    Results for orders placed or performed during the hospital encounter of 04/14/19   hCG, Quantitative, Pregnancy   Result Value Ref Range    HCG Quantitative 45,448.00 mIU/mL   CBC Auto Differential   Result Value Ref Range    WBC 10.30 3.40 - 10.80 10*3/mm3    RBC 4.64 3.77 - 5.28 10*6/mm3    Hemoglobin " 14.0 12.0 - 15.9 g/dL    Hematocrit 40.5 34.0 - 46.6 %    MCV 87.3 79.0 - 97.0 fL    MCH 30.2 26.6 - 33.0 pg    MCHC 34.6 31.5 - 35.7 g/dL    RDW 12.4 12.3 - 15.4 %    RDW-SD 39.6 37.0 - 54.0 fl    MPV 9.9 6.0 - 12.0 fL    Platelets 235 140 - 450 10*3/mm3    Neutrophil % 68.6 42.7 - 76.0 %    Lymphocyte % 23.3 19.6 - 45.3 %    Monocyte % 6.2 5.0 - 12.0 %    Eosinophil % 1.3 0.3 - 6.2 %    Basophil % 0.3 0.0 - 1.5 %    Immature Grans % 0.3 0.0 - 0.5 %    Neutrophils, Absolute 7.07 (H) 1.40 - 7.00 10*3/mm3    Lymphocytes, Absolute 2.40 0.70 - 3.10 10*3/mm3    Monocytes, Absolute 0.64 0.10 - 0.90 10*3/mm3    Eosinophils, Absolute 0.13 0.00 - 0.40 10*3/mm3    Basophils, Absolute 0.03 0.00 - 0.20 10*3/mm3    Immature Grans, Absolute 0.03 0.00 - 0.05 10*3/mm3    RhIg Evaluation   Result Value Ref Range    ABO Type A     RH type Positive    Doses of Rh Immune Globulin   Result Value Ref Range    Number of Doses       RhIg is not indicated due to the patient's Rh status   Light Blue Top   Result Value Ref Range    Extra Tube hold for add-on    Green Top (Gel)   Result Value Ref Range    Extra Tube Hold for add-ons.    Lavender Top   Result Value Ref Range    Extra Tube hold for add-on    Gold Top - SST   Result Value Ref Range    Extra Tube Hold for add-ons.      Problem List Items Addressed This Visit        Endocrine    Acquired hypothyroidism - Primary     Nearly 11 weeks pregnant  No missed doses or problems with dosing  A lot of morning sickness  Check tfts   F/u 6 weeks          Relevant Orders    TSH    T4, Free    Thyroid Antibodies        Return in about 6 weeks (around 2019).    Enedina Jones MA  Signed Stefanie Armstrong MD

## 2019-05-08 ENCOUNTER — TELEPHONE (OUTPATIENT)
Dept: INTERNAL MEDICINE | Facility: CLINIC | Age: 29
End: 2019-05-08

## 2019-05-08 LAB
THYROGLOB AB SERPL-ACNC: <1 IU/ML (ref 0–0.9)
THYROPEROXIDASE AB SERPL-ACNC: 89 IU/ML (ref 0–34)

## 2019-05-08 RX ORDER — LEVOTHYROXINE SODIUM 0.12 MG/1
125 TABLET ORAL DAILY
Qty: 90 TABLET | Refills: 1 | Status: SHIPPED | OUTPATIENT
Start: 2019-05-08

## 2019-06-07 ENCOUNTER — OFFICE VISIT (OUTPATIENT)
Dept: ENDOCRINOLOGY | Facility: CLINIC | Age: 29
End: 2019-06-07

## 2019-06-07 VITALS
BODY MASS INDEX: 44.76 KG/M2 | SYSTOLIC BLOOD PRESSURE: 108 MMHG | HEIGHT: 63 IN | WEIGHT: 252.6 LBS | OXYGEN SATURATION: 98 % | HEART RATE: 96 BPM | DIASTOLIC BLOOD PRESSURE: 60 MMHG

## 2019-06-07 DIAGNOSIS — E03.9 ACQUIRED HYPOTHYROIDISM: Primary | ICD-10-CM

## 2019-06-07 LAB
T4 FREE SERPL-MCNC: 1.44 NG/DL (ref 0.93–1.7)
TSH SERPL DL<=0.05 MIU/L-ACNC: 1.95 MIU/ML (ref 0.27–4.2)

## 2019-06-07 PROCEDURE — 84439 ASSAY OF FREE THYROXINE: CPT | Performed by: INTERNAL MEDICINE

## 2019-06-07 PROCEDURE — 99213 OFFICE O/P EST LOW 20 MIN: CPT | Performed by: INTERNAL MEDICINE

## 2019-06-07 PROCEDURE — 84443 ASSAY THYROID STIM HORMONE: CPT | Performed by: INTERNAL MEDICINE

## 2019-06-07 NOTE — ASSESSMENT & PLAN NOTE
Check tfts today   On synthroid 125 mcg daily dose   No missed doses  Strategy for redosing if vomiting discussed

## 2019-06-07 NOTE — PROGRESS NOTES
Malissa Elizabeth 29 y.o.  CC:Follow-up and Hypothyroidism (MARIAH 11- OB: Anayeli Magaña CNM fax# [phone])      [place]: Follow-up and Hypothyroidism (MARIAH 11- OB: Anayeli Magaña CNM fax# [phone])    Is on thyroid supplement - 125 mcg daily   Is 15 weeks pregnant currently   No missed doses- occ vomiting meds  Energy is good overall     Allergies   Allergen Reactions   • Claritin [Loratadine] Cough   • Promethazine Swelling       Current Outpatient Medications:   •  albuterol (PROVENTIL HFA;VENTOLIN HFA) 108 (90 Base) MCG/ACT inhaler, Inhale 2 puffs Every 4 (Four) Hours As Needed for Wheezing., Disp: 1 inhaler, Rfl: 0  •  levothyroxine (SYNTHROID, LEVOTHROID) 125 MCG tablet, Take 1 tablet by mouth Daily., Disp: 90 tablet, Rfl: 1  •  Prenatal Vit-Fe Fumarate-FA (PRENATAL VITAMIN PO), Take 1 tablet by mouth Daily., Disp: , Rfl:   Patient Active Problem List    Diagnosis   • Acid reflux [K21.9]   • Acute chemical bronchitis (CMS/HCC) [J68.0]   • N&V (nausea and vomiting) [R11.2]   • Acquired hypothyroidism [E03.9]     Review of Systems   Constitutional: Negative for activity change, appetite change, chills, diaphoresis, fatigue, fever and unexpected weight change.   HENT: Negative for congestion, dental problem, drooling, ear discharge, ear pain, facial swelling, hearing loss, mouth sores, nosebleeds, postnasal drip, rhinorrhea, sinus pressure, sneezing, sore throat, tinnitus, trouble swallowing and voice change.    Eyes: Negative for photophobia, pain, discharge, redness, itching and visual disturbance.   Respiratory: Negative for apnea, cough, choking, chest tightness, shortness of breath, wheezing and stridor.    Cardiovascular: Negative for chest pain, palpitations and leg swelling.   Gastrointestinal: Negative for abdominal distention, abdominal pain, anal bleeding, blood in stool, constipation, diarrhea, nausea, rectal pain and vomiting.   Endocrine: Negative for cold intolerance, heat intolerance,  polydipsia, polyphagia and polyuria.   Genitourinary: Negative for decreased urine volume, difficulty urinating, dysuria, enuresis, flank pain, frequency, genital sores, hematuria and urgency.   Musculoskeletal: Negative for arthralgias, back pain, gait problem, joint swelling, myalgias, neck pain and neck stiffness.   Skin: Negative for color change, pallor, rash and wound.   Allergic/Immunologic: Negative for environmental allergies, food allergies and immunocompromised state.   Neurological: Negative for dizziness, tremors, seizures, syncope, facial asymmetry, speech difficulty, weakness, light-headedness, numbness and headaches.   Hematological: Negative for adenopathy. Does not bruise/bleed easily.   Psychiatric/Behavioral: Negative for agitation, behavioral problems, confusion, decreased concentration, dysphoric mood, hallucinations, self-injury, sleep disturbance and suicidal ideas. The patient is not nervous/anxious and is not hyperactive.      Social History     Socioeconomic History   • Marital status:      Spouse name: Not on file   • Number of children: Not on file   • Years of education: Not on file   • Highest education level: Not on file   Tobacco Use   • Smoking status: Former Smoker     Packs/day: 0.50     Types: Cigarettes     Last attempt to quit: 10/12/2016     Years since quittin.6   • Smokeless tobacco: Never Used   Substance and Sexual Activity   • Alcohol use: No     Comment: occasionally when not pregnant   • Drug use: No   • Sexual activity: Yes     Partners: Male     Family History   Problem Relation Age of Onset   • Arthritis Mother    • Hyperlipidemia Mother    • Cancer Sister    • Diabetes Maternal Grandmother    • Obesity Maternal Grandmother    • Diabetes Maternal Grandfather    • Heart attack Maternal Grandfather    • Kidney disease Maternal Grandfather    • Obesity Maternal Grandfather    • Diabetes Paternal Grandmother    • Hypertension Paternal Grandmother    • Obesity  "Paternal Grandmother    • Diabetes Paternal Grandfather    • Obesity Paternal Grandfather    • Migraines Father      /60   Pulse 96   Ht 160 cm (63\")   Wt 115 kg (252 lb 9.6 oz)   LMP 02/14/2019 (Approximate)   SpO2 98%   BMI 44.75 kg/m²   Physical Exam   Constitutional: She is oriented to person, place, and time. She appears well-developed and well-nourished.   HENT:   Head: Normocephalic and atraumatic.   Nose: Nose normal.   Mouth/Throat: Oropharynx is clear and moist.   Eyes: Conjunctivae, EOM and lids are normal. Pupils are equal, round, and reactive to light.   Neck: Trachea normal and normal range of motion. Neck supple. Carotid bruit is not present. No tracheal deviation present. No thyroid mass and no thyromegaly present.   Cardiovascular: Normal rate, regular rhythm, normal heart sounds and intact distal pulses. Exam reveals no gallop and no friction rub.   No murmur heard.  Pulmonary/Chest: Effort normal and breath sounds normal. No respiratory distress. She has no wheezes.   Musculoskeletal: Normal range of motion. She exhibits no edema or deformity.   Lymphadenopathy:     She has no cervical adenopathy.   Neurological: She is alert and oriented to person, place, and time. She has normal reflexes. She displays normal reflexes. No cranial nerve deficit.   Skin: Skin is warm and dry. No rash noted. No cyanosis or erythema. Nails show no clubbing.   Psychiatric: She has a normal mood and affect. Her speech is normal and behavior is normal. Judgment and thought content normal. Cognition and memory are normal.   Nursing note and vitals reviewed.    Results for orders placed or performed in visit on 05/07/19   TSH   Result Value Ref Range    TSH 3.490 0.270 - 4.200 mIU/mL   T4, Free   Result Value Ref Range    Free T4 1.37 0.93 - 1.70 ng/dL   Thyroid Antibodies   Result Value Ref Range    Thyroid Peroxidase Antibody 89 (H) 0 - 34 IU/mL    Thyroglobulin Ab <1.0 0.0 - 0.9 IU/mL     Problem List Items " Addressed This Visit        Endocrine    Acquired hypothyroidism - Primary     Check tfts today   On synthroid 125 mcg daily dose   No missed doses  Strategy for redosing if vomiting discussed          Relevant Orders    T4, Free    TSH        Return in about 8 weeks (around 8/2/2019) for Recheck 30 min .    Enedina Jones MA  Signed Stefanie Armstrong MD

## 2019-08-05 ENCOUNTER — OFFICE VISIT (OUTPATIENT)
Dept: ENDOCRINOLOGY | Facility: CLINIC | Age: 29
End: 2019-08-05

## 2019-08-05 VITALS
OXYGEN SATURATION: 99 % | DIASTOLIC BLOOD PRESSURE: 70 MMHG | WEIGHT: 267 LBS | SYSTOLIC BLOOD PRESSURE: 112 MMHG | BODY MASS INDEX: 49.13 KG/M2 | HEIGHT: 62 IN | HEART RATE: 111 BPM

## 2019-08-05 DIAGNOSIS — E03.9 ACQUIRED HYPOTHYROIDISM: Primary | ICD-10-CM

## 2019-08-05 PROCEDURE — 99213 OFFICE O/P EST LOW 20 MIN: CPT | Performed by: INTERNAL MEDICINE

## 2019-08-05 PROCEDURE — 84443 ASSAY THYROID STIM HORMONE: CPT | Performed by: INTERNAL MEDICINE

## 2019-08-05 PROCEDURE — 84439 ASSAY OF FREE THYROXINE: CPT | Performed by: INTERNAL MEDICINE

## 2019-08-05 NOTE — PROGRESS NOTES
Malissa Johnson 29 y.o.  CC:Follow-up and Hypothyroidism (MARIAH 11-23-19  OB: Anayeli Magaña (fax #  [phone]))      [place]: Follow-up and Hypothyroidism (MARIAH 11-23-19  OB: Anayeli Magaña (fax #  [phone]))    Is on synthroid 125 mcg daily   Last tfts normal 6/19  No missed doses or problems with dosing   Due for tfts     Allergies   Allergen Reactions   • Claritin [Loratadine] Cough   • Promethazine Swelling       Current Outpatient Medications:   •  albuterol (PROVENTIL HFA;VENTOLIN HFA) 108 (90 Base) MCG/ACT inhaler, Inhale 2 puffs Every 4 (Four) Hours As Needed for Wheezing., Disp: 1 inhaler, Rfl: 0  •  levothyroxine (SYNTHROID, LEVOTHROID) 125 MCG tablet, Take 1 tablet by mouth Daily., Disp: 90 tablet, Rfl: 1  •  Prenatal Vit-Fe Fumarate-FA (PRENATAL VITAMIN PO), Take 1 tablet by mouth Daily., Disp: , Rfl:   Patient Active Problem List    Diagnosis   • Acid reflux [K21.9]   • Acute chemical bronchitis (CMS/HCC) [J68.0]   • N&V (nausea and vomiting) [R11.2]   • Acquired hypothyroidism [E03.9]     Review of Systems   Constitutional: Negative for activity change, appetite change, chills, diaphoresis, fatigue, fever and unexpected weight change.   HENT: Negative for congestion, dental problem, drooling, ear discharge, ear pain, facial swelling, hearing loss, mouth sores, nosebleeds, postnasal drip, rhinorrhea, sinus pressure, sneezing, sore throat, tinnitus, trouble swallowing and voice change.    Eyes: Negative for photophobia, pain, discharge, redness, itching and visual disturbance.   Respiratory: Negative for apnea, cough, choking, chest tightness, shortness of breath, wheezing and stridor.    Cardiovascular: Negative for chest pain, palpitations and leg swelling.   Gastrointestinal: Negative for abdominal distention, abdominal pain, anal bleeding, blood in stool, constipation, diarrhea, nausea, rectal pain and vomiting.   Endocrine: Negative for cold intolerance, heat intolerance, polydipsia, polyphagia and  polyuria.   Genitourinary: Negative for decreased urine volume, difficulty urinating, dysuria, enuresis, flank pain, frequency, genital sores, hematuria and urgency.   Musculoskeletal: Negative for arthralgias, back pain, gait problem, joint swelling, myalgias, neck pain and neck stiffness.   Skin: Negative for color change, pallor, rash and wound.   Allergic/Immunologic: Negative for environmental allergies, food allergies and immunocompromised state.   Neurological: Negative for dizziness, tremors, seizures, syncope, facial asymmetry, speech difficulty, weakness, light-headedness, numbness and headaches.   Hematological: Negative for adenopathy. Does not bruise/bleed easily.   Psychiatric/Behavioral: Negative for agitation, behavioral problems, confusion, decreased concentration, dysphoric mood, hallucinations, self-injury, sleep disturbance and suicidal ideas. The patient is not nervous/anxious and is not hyperactive.      Social History     Socioeconomic History   • Marital status:      Spouse name: Not on file   • Number of children: Not on file   • Years of education: Not on file   • Highest education level: Not on file   Tobacco Use   • Smoking status: Former Smoker     Packs/day: 0.50     Types: Cigarettes     Last attempt to quit: 10/12/2016     Years since quittin.8   • Smokeless tobacco: Never Used   Substance and Sexual Activity   • Alcohol use: No     Comment: occasionally when not pregnant   • Drug use: No   • Sexual activity: Yes     Partners: Male     Family History   Problem Relation Age of Onset   • Arthritis Mother    • Hyperlipidemia Mother    • Cancer Sister    • Diabetes Maternal Grandmother    • Obesity Maternal Grandmother    • Diabetes Maternal Grandfather    • Heart attack Maternal Grandfather    • Kidney disease Maternal Grandfather    • Obesity Maternal Grandfather    • Diabetes Paternal Grandmother    • Hypertension Paternal Grandmother    • Obesity Paternal Grandmother    •  "Diabetes Paternal Grandfather    • Obesity Paternal Grandfather    • Migraines Father      /70   Pulse 111   Ht 157.5 cm (62\")   Wt 121 kg (267 lb)   LMP 02/14/2019 (Approximate)   SpO2 99%   BMI 48.83 kg/m²   Physical Exam   Constitutional: She is oriented to person, place, and time. She appears well-developed and well-nourished.   HENT:   Head: Normocephalic and atraumatic.   Nose: Nose normal.   Mouth/Throat: Oropharynx is clear and moist.   Eyes: Conjunctivae, EOM and lids are normal. Pupils are equal, round, and reactive to light.   Neck: Trachea normal and normal range of motion. Neck supple. Carotid bruit is not present. No tracheal deviation present. No thyroid mass and no thyromegaly present.   Cardiovascular: Normal rate, regular rhythm, normal heart sounds and intact distal pulses. Exam reveals no gallop and no friction rub.   No murmur heard.  Pulmonary/Chest: Effort normal and breath sounds normal. No respiratory distress. She has no wheezes.   Musculoskeletal: Normal range of motion. She exhibits no edema or deformity.   Lymphadenopathy:     She has no cervical adenopathy.   Neurological: She is alert and oriented to person, place, and time. She has normal reflexes. She displays normal reflexes. No cranial nerve deficit.   Skin: Skin is warm and dry. No rash noted. No cyanosis or erythema. Nails show no clubbing.   Psychiatric: She has a normal mood and affect. Her speech is normal and behavior is normal. Judgment and thought content normal. Cognition and memory are normal.   Nursing note and vitals reviewed.    Results for orders placed or performed in visit on 06/07/19   T4, Free   Result Value Ref Range    Free T4 1.44 0.93 - 1.70 ng/dL   TSH   Result Value Ref Range    TSH 1.950 0.270 - 4.200 mIU/mL     Problem List Items Addressed This Visit        Endocrine    Acquired hypothyroidism - Primary     Update tfts   Continue current supplement          Relevant Orders    T4, Free    TSH    "     Currently pregnant - second trimester  Return in about 8 weeks (around 9/30/2019) for Recheck 30 min .    Enedina Jones MA  Signed Stefanie Armstrong MD

## 2019-08-06 LAB
T4 FREE SERPL-MCNC: 1.2 NG/DL (ref 0.93–1.7)
TSH SERPL DL<=0.05 MIU/L-ACNC: 1.89 MIU/ML (ref 0.27–4.2)

## 2021-10-18 NOTE — ASSESSMENT & PLAN NOTE
Nearly 11 weeks pregnant  No missed doses or problems with dosing  A lot of morning sickness  Check tfts   F/u 6 weeks    No